# Patient Record
Sex: MALE | Race: WHITE
[De-identification: names, ages, dates, MRNs, and addresses within clinical notes are randomized per-mention and may not be internally consistent; named-entity substitution may affect disease eponyms.]

---

## 2017-02-07 ENCOUNTER — HOSPITAL ENCOUNTER (EMERGENCY)
Dept: HOSPITAL 26 - MED | Age: 37
Discharge: HOME | End: 2017-02-07
Payer: COMMERCIAL

## 2017-02-07 VITALS — DIASTOLIC BLOOD PRESSURE: 87 MMHG | SYSTOLIC BLOOD PRESSURE: 138 MMHG

## 2017-02-07 VITALS — HEIGHT: 70 IN | WEIGHT: 270 LBS | BODY MASS INDEX: 38.65 KG/M2

## 2017-02-07 VITALS — SYSTOLIC BLOOD PRESSURE: 155 MMHG | DIASTOLIC BLOOD PRESSURE: 110 MMHG

## 2017-02-07 DIAGNOSIS — R33.9: Primary | ICD-10-CM

## 2017-02-07 DIAGNOSIS — I48.91: ICD-10-CM

## 2017-02-07 DIAGNOSIS — I10: ICD-10-CM

## 2017-02-07 PROCEDURE — 85730 THROMBOPLASTIN TIME PARTIAL: CPT

## 2017-02-07 PROCEDURE — 99284 EMERGENCY DEPT VISIT MOD MDM: CPT

## 2017-02-07 PROCEDURE — 36415 COLL VENOUS BLD VENIPUNCTURE: CPT

## 2017-02-07 PROCEDURE — 80053 COMPREHEN METABOLIC PANEL: CPT

## 2017-02-07 PROCEDURE — 85610 PROTHROMBIN TIME: CPT

## 2017-02-07 PROCEDURE — 82140 ASSAY OF AMMONIA: CPT

## 2017-02-07 PROCEDURE — 96372 THER/PROPH/DIAG INJ SC/IM: CPT

## 2017-02-07 PROCEDURE — 85025 COMPLETE CBC W/AUTO DIFF WBC: CPT

## 2017-02-07 PROCEDURE — 96360 HYDRATION IV INFUSION INIT: CPT

## 2017-02-07 NOTE — NUR
SUPRAPUBIC CATHETER DISLODGED; AWAITING FOR ER MD DR. COLINDRES EVALUATION; PT 
DENIES PAIN OR DISCOMFORT AT THIS TIME; FAMILY AT BEDSIDE. WILL CONTINUE TO 
MONITOR.

## 2017-02-07 NOTE — NUR
36/M BIB FAMILY C/O SUPRAPUBIC CATHETER DISLODGEMENT X TODAY, LAST VOID 1 HR 
AGO. HX URETHRAL STRICTURESPATIENT. PT DENIES N/V/D; SKIN IS PINK/WARM/DRY; 
AAOX4 WITH EVEN AND STEADY GAIT; LUNGS CLEAR BL; HR EVEN AND REGULAR; PT DENIES 
ANY FEVER, CP, SOB, OR COUGH AT THIS TIME; PATIENT STATES PAIN OF 0/10 AT THIS 
TIME;PATIENT POSITIONED FOR COMFORT; HOB ELEVATED; BEDRAILS UP X2; BED DOWN. ER 
MD MADE AWARE OF PT STATUS.

## 2017-02-07 NOTE — NUR
Patient discharged with v/s stable. Written and verbal after care instructions 
given and explained. 

Patient alert, oriented and verbalized understanding of instructions. 
Ambulatory with steady gait. All questions addressed prior to discharge. ID 
band removed. Patient advised to follow up with PMD. Rx of TRAMADOL 
HYDROCHLORIDE 50 MG given. Patient educated on indication of medication 
including possible reaction and side effects. Opportunity to ask questions 
provided and answered.

## 2017-02-07 NOTE — NUR
ER MD DR. COLINDRES RETURNED FROM ICU; NOTIFIED OF SUPRAPUBIC CATHETER 
DISLODGEMENT; PHYSICIAN TO SEE PT; WILL CONTINUE TO MONITOR.

## 2023-04-06 ENCOUNTER — HOSPITAL ENCOUNTER (EMERGENCY)
Dept: HOSPITAL 26 - MED | Age: 43
Discharge: HOME | End: 2023-04-06
Payer: COMMERCIAL

## 2023-04-06 VITALS — DIASTOLIC BLOOD PRESSURE: 81 MMHG | SYSTOLIC BLOOD PRESSURE: 121 MMHG

## 2023-04-06 VITALS — WEIGHT: 250 LBS | HEIGHT: 68 IN | BODY MASS INDEX: 37.89 KG/M2

## 2023-04-06 DIAGNOSIS — Z79.2: ICD-10-CM
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
## 2023-04-06 DIAGNOSIS — I48.91: ICD-10-CM

## 2023-04-06 DIAGNOSIS — E11.9: ICD-10-CM

## 2023-04-06 DIAGNOSIS — S61.512A: Primary | ICD-10-CM

## 2023-04-06 DIAGNOSIS — I10: ICD-10-CM
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
## 2023-04-06 PROCEDURE — 99283 EMERGENCY DEPT VISIT LOW MDM: CPT

## 2023-04-06 PROCEDURE — 73110 X-RAY EXAM OF WRIST: CPT

## 2023-04-06 PROCEDURE — 90715 TDAP VACCINE 7 YRS/> IM: CPT

## 2023-04-06 PROCEDURE — 90471 IMMUNIZATION ADMIN: CPT

## 2023-04-06 PROCEDURE — 12001 RPR S/N/AX/GEN/TRNK 2.5CM/<: CPT
